# Patient Record
Sex: FEMALE | Race: WHITE | Employment: STUDENT | URBAN - METROPOLITAN AREA
[De-identification: names, ages, dates, MRNs, and addresses within clinical notes are randomized per-mention and may not be internally consistent; named-entity substitution may affect disease eponyms.]

---

## 2022-09-26 ENCOUNTER — OFFICE VISIT (OUTPATIENT)
Dept: OBGYN CLINIC | Age: 22
End: 2022-09-26
Payer: COMMERCIAL

## 2022-09-26 VITALS
HEART RATE: 104 BPM | WEIGHT: 137.8 LBS | HEIGHT: 62 IN | DIASTOLIC BLOOD PRESSURE: 82 MMHG | SYSTOLIC BLOOD PRESSURE: 128 MMHG | BODY MASS INDEX: 25.36 KG/M2

## 2022-09-26 DIAGNOSIS — Z30.09 BIRTH CONTROL COUNSELING: ICD-10-CM

## 2022-09-26 DIAGNOSIS — N94.6 DYSMENORRHEA: Primary | ICD-10-CM

## 2022-09-26 PROBLEM — L70.9 ACNE: Status: ACTIVE | Noted: 2020-07-13

## 2022-09-26 PROCEDURE — 99203 OFFICE O/P NEW LOW 30 MIN: CPT | Performed by: STUDENT IN AN ORGANIZED HEALTH CARE EDUCATION/TRAINING PROGRAM

## 2022-09-26 RX ORDER — DOXYCYCLINE HYCLATE 100 MG/1
CAPSULE ORAL
COMMUNITY
Start: 2022-09-09

## 2022-09-26 RX ORDER — DROSPIRENONE AND ETHINYL ESTRADIOL 0.02-3(28)
KIT ORAL
COMMUNITY
Start: 2022-09-10 | End: 2022-10-27 | Stop reason: SDUPTHER

## 2022-09-26 NOTE — PROGRESS NOTES
Ori Herring  2022    YOB: 2000    HPI:  Ori Herring is a 25 y.o. female    The patient was seen and examined today. She is here regarding further management of dysmenorrhea. Reports she is taking Mariely (Drospirenone and ethinyl estradiol 3mg/0.02 mg) for her hx of heavy periods and dysmenorrhea. She started taking this brand 4 years ago and it also helped her acne. Recently, her acne has worsened. She established care with derm and started doxy in August. Has noticed a slight improvement in her acne since starting this antibiotic. Reports she wants to stop taking Mariely because it is hard for her to remember to take the pills daily. She was interested in other forms of contraception that will continue to manage her heavy periods and dysmenorrhea. She is currently sexually active.       OB History    Para Term  AB Living   0 0 0 0 0 0   SAB IAB Ectopic Molar Multiple Live Births   0 0 0 0 0 0       Past Medical History:   Diagnosis Date    Psoriasis        Past Surgical History:   Procedure Laterality Date    WISDOM TOOTH EXTRACTION         Family History   Problem Relation Age of Onset    No Known Problems Mother     Hypertension Father     Heart Disease Father     No Known Problems Maternal Grandmother     No Known Problems Maternal Grandfather     No Known Problems Paternal Grandmother     No Known Problems Paternal Grandfather        Social History     Socioeconomic History    Marital status: Single     Spouse name: Not on file    Number of children: Not on file    Years of education: Not on file    Highest education level: Not on file   Occupational History    Not on file   Tobacco Use    Smoking status: Never    Smokeless tobacco: Never   Vaping Use    Vaping Use: Never used   Substance and Sexual Activity    Alcohol use: Never    Drug use: Never    Sexual activity: Yes     Birth control/protection: Pill   Other Topics Concern    Not on file   Social History Narrative    Not on file     Social Determinants of Health     Financial Resource Strain: Not on file   Food Insecurity: Not on file   Transportation Needs: Not on file   Physical Activity: Not on file   Stress: Not on file   Social Connections: Not on file   Intimate Partner Violence: Not on file   Housing Stability: Not on file       MEDICATIONS:  Current Outpatient Medications   Medication Sig Dispense Refill    doxycycline hyclate (VIBRAMYCIN) 100 MG capsule TAKE 1 CAPSULE BY MOUTH TWO TIMES A DAY WITH FOOD      drospirenone-ethinyl estradiol (JANIE) 3-0.02 MG per tablet TAKE 1 TABLET BY MOUTH EVERY DAY       No current facility-administered medications for this visit.        ALLERGIES:  Allergies as of 09/26/2022    (No Known Allergies)       REVIEW OF SYSTEMS:    Constitutional: negative fever, negative chills, negative weight changes   HEENT: negative visual disturbances, negative headaches, negative dizziness, pos acne   Breast: Negative breast abnormalities, negative breast lumps  Respiratory: negative dyspnea, negative cough, negative SOB  Cardiovascular: negative chest pain,  negative palpitations, negative arrhythmia   Gastrointestinal: negative abdominal pain, negative N/V, negative bowel changes, negative heartburn   Genitourinary: negative dysuria, negative hematuria, negative urinary incontinence, negative vaginal discharge,  dysmenorrhea, heavy periods   Dermatological: negative rash, negative pruritis, negative mole changes  Hematologic: negative bruising  Immunologic/Lymphatic: negative recent illness, negative recent sick contact  Musculoskeletal: negative back pain, negative myalgias, negative arthralgias  Neurological:  negative dizziness, negative migraines, negative seizures   Behavior/Psych: negative depression, negative anxiety     VITALS:  Vitals:    09/26/22 1040   BP: 128/82   Site: Left Upper Arm   Position: Sitting   Cuff Size: Medium Adult   Pulse: (!) 104   Weight: 137 lb 12.8 oz (62.5 kg)   Height: 5' 2\" (1.575 m)       PHYSICAL EXAM:  General appearance: no apparent distress, alert and cooperative  HEENT: head atraumatic, normocephalic, trachea midline, moist mucous membranes   Neurologic: alert, oriented, normal speech, no focal findings or movement disorder noted  Lungs: no increased work of breathing   Abdomen: soft, non acute abdomen   Extremities:  no calf tenderness bilaterally, non-edematous bilaterally   Musculoskeletal: no gross abnormalities, range of motion appropriate for age   Psychiatric: mood appropriate, normal affect      Assessment/Plan:  Dysmenorrhea and Birth Control Counseling    - VSS   - Discussed switching from pills to either Depo Provera, Nexplanon or Mirena IUD. Risks vs benefits of each type were reviewed with patient. She ultimately decided on getting the Nexplanon inserted. - Pt to continue taking Mariely until Nexplanon can be inserted    - Reviewed procedure w/ patient and what to expect    - Return to the office for Nexplanon insertion within 1-4 weeks      Diagnosis Orders   1. Dysmenorrhea        2. Birth control counseling          Patient Active Problem List    Diagnosis Date Noted    Dysmenorrhea 07/13/2020     Priority: Medium    Acne 07/13/2020     Priority: Medium     Counseling:  Repeat Annual every 1 year  Cervical Cytology Evaluation begins at 24years old. If Negative Cytology, Follow-up screening per current guidelines. Counseled on preventative health maintenance follow-up. No orders of the defined types were placed in this encounter. Patient was seen with total face to face time of 30 minutes. More than 50% of this visit was counseling and education regarding The primary encounter diagnosis was Dysmenorrhea. A diagnosis of Birth control counseling was also pertinent to this visit.  and Establish Care and Contraception (Currently on OCP for acne but considering switching - acne has flared up)   as well as counseling on preventative health maintenance follow-up.       Roney Hammans, DO [facility]  9/26/2022, 11:28 AM

## 2022-10-27 ENCOUNTER — HOSPITAL ENCOUNTER (OUTPATIENT)
Age: 22
Setting detail: SPECIMEN
Discharge: HOME | End: 2022-10-27

## 2022-10-27 ENCOUNTER — PROCEDURE VISIT (OUTPATIENT)
Dept: OBGYN CLINIC | Age: 22
End: 2022-10-27
Payer: COMMERCIAL

## 2022-10-27 VITALS
HEART RATE: 99 BPM | DIASTOLIC BLOOD PRESSURE: 68 MMHG | WEIGHT: 138.4 LBS | SYSTOLIC BLOOD PRESSURE: 108 MMHG | HEIGHT: 62 IN | BODY MASS INDEX: 25.47 KG/M2

## 2022-10-27 DIAGNOSIS — N89.8 VAGINAL IRRITATION: ICD-10-CM

## 2022-10-27 DIAGNOSIS — Z01.419 WELL WOMAN EXAM WITH ROUTINE GYNECOLOGICAL EXAM: ICD-10-CM

## 2022-10-27 DIAGNOSIS — B37.9 YEAST INFECTION: ICD-10-CM

## 2022-10-27 DIAGNOSIS — Z01.419 WELL WOMAN EXAM WITH ROUTINE GYNECOLOGICAL EXAM: Primary | ICD-10-CM

## 2022-10-27 DIAGNOSIS — Z23 NEED FOR HPV VACCINATION: ICD-10-CM

## 2022-10-27 DIAGNOSIS — N94.6 DYSMENORRHEA: ICD-10-CM

## 2022-10-27 LAB
CONTROL: PRESENT
PREGNANCY TEST URINE, POC: NEGATIVE

## 2022-10-27 PROCEDURE — 90651 9VHPV VACCINE 2/3 DOSE IM: CPT | Performed by: STUDENT IN AN ORGANIZED HEALTH CARE EDUCATION/TRAINING PROGRAM

## 2022-10-27 PROCEDURE — 81025 URINE PREGNANCY TEST: CPT | Performed by: STUDENT IN AN ORGANIZED HEALTH CARE EDUCATION/TRAINING PROGRAM

## 2022-10-27 PROCEDURE — G8484 FLU IMMUNIZE NO ADMIN: HCPCS | Performed by: STUDENT IN AN ORGANIZED HEALTH CARE EDUCATION/TRAINING PROGRAM

## 2022-10-27 PROCEDURE — 90471 IMMUNIZATION ADMIN: CPT | Performed by: STUDENT IN AN ORGANIZED HEALTH CARE EDUCATION/TRAINING PROGRAM

## 2022-10-27 PROCEDURE — 99395 PREV VISIT EST AGE 18-39: CPT | Performed by: STUDENT IN AN ORGANIZED HEALTH CARE EDUCATION/TRAINING PROGRAM

## 2022-10-27 RX ORDER — DROSPIRENONE AND ETHINYL ESTRADIOL 0.02-3(28)
1 KIT ORAL DAILY
Qty: 1 PACKET | Refills: 12 | Status: SHIPPED | OUTPATIENT
Start: 2022-10-27

## 2022-10-27 NOTE — PROGRESS NOTES
After obtaining consent, and per orders of Dr. Tamiko Kenyon, injection of Gardasil #1 given IM in Right deltoid by Jeronimo Padron CMA. Patient instructed to remain in clinic for 20 minutes afterwards, and to report any adverse reaction to me immediately.   LOT [id number]  EXP 8/9/24  NDC 9179-1155-07  RTC 2 months for 2nd dose

## 2022-10-27 NOTE — PROGRESS NOTES
Rodri Gayle  10/27/2022              25 y.o. Chief Complaint   Patient presents with    Annual Exam     Refill OCP  - decided against Nexplanon          Patient's last menstrual period was 10/10/2022. Primary Care Physician: No primary care provider on file. HPI : Rodri Gayle is a 25 y.o. female     Patient is here today for her well women annual exam. She was seen and examined. Reports she still has dysmenorrhea and was previously scheduled for a Nexplanon insertion today. She states she changed her mind and wants to continue using Mariely. Reports she will try to have better compliance with taking the Mariely daily to see if that improves her dysmenorrhea. She said she did not like the possibility of having irregular spotting with the Nexplanon so she wants to give OCPs one more chance before switching. She is currently sexually active but not regularly. She has had unprotected sex in the past. Reports some vaginal irritation over the last several days but thinks it is from the \"vaginal wash\"  she is using. She is currently spotting and does not want a spec exam but is agreeable to blind swabs.      She is agreeable to start gardasil today too   ________________________________________________________________________  OB History    Para Term  AB Living   0 0 0 0 0 0   SAB IAB Ectopic Molar Multiple Live Births   0 0 0 0 0 0     Past Medical History:   Diagnosis Date    Psoriasis                                                                    Past Surgical History:   Procedure Laterality Date    WISDOM TOOTH EXTRACTION       Family History   Problem Relation Age of Onset    No Known Problems Mother     Hypertension Father     Heart Disease Father     No Known Problems Maternal Grandmother     No Known Problems Maternal Grandfather     No Known Problems Paternal Grandmother     No Known Problems Paternal Grandfather      Social History     Socioeconomic History Marital status: Single     Spouse name: Not on file    Number of children: Not on file    Years of education: Not on file    Highest education level: Not on file   Occupational History    Not on file   Tobacco Use    Smoking status: Never    Smokeless tobacco: Never   Vaping Use    Vaping Use: Never used   Substance and Sexual Activity    Alcohol use: Never    Drug use: Never    Sexual activity: Yes     Birth control/protection: Pill   Other Topics Concern    Not on file   Social History Narrative    Not on file     Social Determinants of Health     Financial Resource Strain: Not on file   Food Insecurity: Not on file   Transportation Needs: Not on file   Physical Activity: Not on file   Stress: Not on file   Social Connections: Not on file   Intimate Partner Violence: Not on file   Housing Stability: Not on file       MEDICATIONS:  Current Outpatient Medications   Medication Sig Dispense Refill    drospirenone-ethinyl estradiol (JANIE) 3-0.02 MG per tablet Take 1 tablet by mouth daily 1 packet 12    doxycycline hyclate (VIBRAMYCIN) 100 MG capsule TAKE 1 CAPSULE BY MOUTH TWO TIMES A DAY WITH FOOD       No current facility-administered medications for this visit. ALLERGIES:  Allergies as of 10/27/2022    (No Known Allergies)       Symptoms of decreased mood: absent  Symptoms of anhedonia: absent       Immunization status: up to date and documented. - reports she got flu last week but not uploaded yet into the system     Gynecologic History:        Patient's last menstrual period was 10/10/2022.     Sexually Active: Yes- with males   STD History: No   Permanent Sterilization: No   Reversible Birth Control: Yes - Janie       Hormone Replacement Exposure: No    Genetic Qualified Family History of Breast, Ovarian , Colon or Uterine Cancer: No      Preventative Health Testing:  Health Maintenance Due:  Health Maintenance Due   Topic Date Due    Depression Screen  Never done    HIV screen  Never done    Chlamydia/GC screen  Never done    Hepatitis C screen  Never done    COVID-19 Vaccine (4 - Booster for Pfizer series) 07/20/2022    Flu vaccine (1) 08/01/2022       Date of Last Pap Smear: too young   Abnormal Pap Smear History: n/a  Colposcopy History: n/a  Date of Last Mammogram: n/a  Date of Last Colonoscopy: n/a  Date of Last Bone Density: n/a  ________________________________________________________________________  REVIEW OF SYSTEMS:    Constitutional: negative fever, negative chills, negative weight changes   HEENT: negative visual disturbances, negative headaches, negative dizziness   Breast: negative breast abnormalities, negative breast lumps, negative nipple discharge  Respiratory: negative dyspnea, negative cough, negative SOB  Cardiovascular: negative chest pain, negative palpitations, negative syncope   Gastrointestinal: negative abdominal pain, negative N/V, negative bowel changes, negative heartburn   Genitourinary: negative dysuria, negative hematuria, negative urinary incontinence, negative vaginal discharge,  pos dysmenorrhea   Dermatological: negative rash, negative pruritis, negative mole changes  Hematologic: negative bruising  Immunologic/Lymphatic: negative recent illness, negative recent sick contact  Musculoskeletal: negative back pain, negative myalgias, negative arthralgias  Neurological:  negative dizziness, negative migraines, negative seizures, negative weakness  Behavior/Psych: negative SI, negative HI                                                                                                                                                                                 PHYSICAL Exam:     Constitutional:  Vitals:    10/27/22 1044   BP: 108/68   Site: Left Upper Arm   Position: Sitting   Cuff Size: Medium Adult   Pulse: 99   Weight: 138 lb 6.4 oz (62.8 kg)   Height: 5' 2\" (1.575 m)        General appearance:  no apparent distress, alert and cooperative, BMI reviewed   HEENT: normocephalic, atraumatic, neck supple, no JVD, thyroid not enlarged with no palpable masses  Lymphatic: no lymph nodes were palpable in neck, axilla or groin   Neurologic:  normal speech, no focal findings or movement disorder noted  Lungs:  no increased work of breathing, good air exchange, clear to auscultation bilaterally, no crackles or wheezing  Heart:  regular rate and rhythm, no murmurs noted     Abdomen:  soft, non-tender, no guarding, rebound or rigidity on palpation, bowel sounds appreciated in all quadrants   Musculoskeletal: normal gait noted, no gross abnormalities appreciated, range of motion, stability and strength were evaluated and found to be appropriate for patient's age   Extremities:  no calf tenderness bilaterally, non-edematous bilaterally   Skin: normal inspection of skin without rashes or lesions  Psych:  normal orientation to time, place and person, good historian, no mood or affect changes, pleasant     Pelvic Exam: chaperone declined     Vulva: normal appearing vulva, no masses, tenderness or lesions, normal clitoris    Vagina: normal appearing urethral meatus, blood present at vulva consistent with spotting    Cervix: declined bimanual/spec    Uterus: declined bimanual    Adnexa: declined bimanual   Rectal Exam: external exam unremarkable   Breast: declined     POC Cultures:  Results for POC orders placed in visit on 10/27/22   POCT urine pregnancy   Result Value Ref Range    Preg Test, Ur NEGATIVE     Control present        ASSESSMENT/PLAN:  Peg Sharma is a 25 y.o. female  here for her annual exam      Well Women Gynecological Exam   - VSS      - Not due for pap due to age   - Vaginitis and GC/C collected and pending. Will call patient with any abnormal results      - Lengthy conversation held for STD counseling and prevention. Patient declined blood work for T.Pal, Hepatitis and HIV testing    - Birth control and barrier recommendations discussed. Will continue using Mariely.  Is going to try to be consistent with setting alarms and taking her pills nightly. Discussed consistent condom use especially if she is not always taking her pills. - Gardasil counseling completed for all patients 10-36 years old    -   Patient would like to start vaccine series today     -   Patient to return in 2 months and 6 months to complete series    - S/p flu vaccine last week, records not uploaded yet    - S/p COVID19 vaccine x3    - Routine health maintenance per patient's PCP   - Repeat annual exam every year   Return in about 2 months (around 12/27/2022) for gardasil vaccine . Dysmenorrhea    - Declined nexplanon insertion today    - Urine preg negative today    - Continue with Mariely    - Discussed pre medicating with motrin 600 mg Q6 prior to period starting and continuing for the first 24-48 hrs when her cramps are the most severe . States she has motrin at home and does not need a Rx    - If she cannot be consistent with Mariely, encouraged her to schedule a sooner follow up so can we find a form of hormone contraception that she will be compliant with    - New Rx  sent with refills    - PT was agreeable w/ plan     Counseling Completed:  Hereditary breast, ovarian, colon and uterine cancer screening done  Tobacco and secondary smoke risks reviewed.  Instructed on cessation and avoidance     Orders Placed This Encounter   Procedures    Vaginitis DNA Probe     Standing Status:   Future     Standing Expiration Date:   10/27/2023    C.trachomatis N.gonorrhoeae DNA     Standing Status:   Future     Standing Expiration Date:   10/27/2023    HPV, GARDASIL 9, (age 10-36 yrs), IM    POCT urine pregnancy    DC INJECTION,THERAP/PROPH/DIAGNOST, IM OR SUBCUT        Patient Active Problem List    Diagnosis Date Noted    Dysmenorrhea 07/13/2020     Priority: Medium    Acne 07/13/2020     Priority: [address], DO [facility]  10/27/2022, 12:22 PM

## 2022-10-28 LAB
C TRACH DNA GENITAL QL NAA+PROBE: NEGATIVE
CANDIDA SPECIES, DNA PROBE: POSITIVE
GARDNERELLA VAGINALIS, DNA PROBE: NEGATIVE
N. GONORRHOEAE DNA: NEGATIVE
SOURCE: ABNORMAL
SPECIMEN DESCRIPTION: NORMAL
TRICHOMONAS VAGINALIS DNA: NEGATIVE

## 2022-10-28 RX ORDER — FLUCONAZOLE 150 MG/1
150 TABLET ORAL ONCE
Qty: 1 TABLET | Refills: 0 | Status: SHIPPED | OUTPATIENT
Start: 2022-10-28 | End: 2022-10-28

## 2022-12-28 ENCOUNTER — NURSE ONLY (OUTPATIENT)
Dept: OBGYN CLINIC | Age: 22
End: 2022-12-28
Payer: COMMERCIAL

## 2022-12-28 VITALS — WEIGHT: 138 LBS | BODY MASS INDEX: 25.4 KG/M2 | HEIGHT: 62 IN

## 2022-12-28 DIAGNOSIS — Z23 NEED FOR HPV VACCINE: Primary | ICD-10-CM

## 2022-12-28 PROCEDURE — 90471 IMMUNIZATION ADMIN: CPT | Performed by: NURSE PRACTITIONER

## 2022-12-28 PROCEDURE — 90651 9VHPV VACCINE 2/3 DOSE IM: CPT | Performed by: NURSE PRACTITIONER

## 2022-12-28 NOTE — PROGRESS NOTES
After obtaining consent, and per orders of  Lauren Rodriguez CNP , injection of HPV 0.5mL given in Left deltoid IM by [facility]. Patient instructed to remain in clinic for 20 minutes afterwards, and to report any adverse reaction to me immediately.

## 2023-02-22 SDOH — ECONOMIC STABILITY: TRANSPORTATION INSECURITY
IN THE PAST 12 MONTHS, HAS LACK OF TRANSPORTATION KEPT YOU FROM MEETINGS, WORK, OR FROM GETTING THINGS NEEDED FOR DAILY LIVING?: NO

## 2023-02-22 SDOH — ECONOMIC STABILITY: FOOD INSECURITY: WITHIN THE PAST 12 MONTHS, YOU WORRIED THAT YOUR FOOD WOULD RUN OUT BEFORE YOU GOT MONEY TO BUY MORE.: NEVER TRUE

## 2023-02-22 SDOH — ECONOMIC STABILITY: HOUSING INSECURITY
IN THE LAST 12 MONTHS, WAS THERE A TIME WHEN YOU DID NOT HAVE A STEADY PLACE TO SLEEP OR SLEPT IN A SHELTER (INCLUDING NOW)?: NO

## 2023-02-22 SDOH — ECONOMIC STABILITY: INCOME INSECURITY: HOW HARD IS IT FOR YOU TO PAY FOR THE VERY BASICS LIKE FOOD, HOUSING, MEDICAL CARE, AND HEATING?: NOT HARD AT ALL

## 2023-02-22 SDOH — ECONOMIC STABILITY: FOOD INSECURITY: WITHIN THE PAST 12 MONTHS, THE FOOD YOU BOUGHT JUST DIDN'T LAST AND YOU DIDN'T HAVE MONEY TO GET MORE.: NEVER TRUE

## 2023-02-23 ENCOUNTER — HOSPITAL ENCOUNTER (OUTPATIENT)
Age: 23
Setting detail: SPECIMEN
Discharge: HOME | End: 2023-02-23

## 2023-02-23 ENCOUNTER — OFFICE VISIT (OUTPATIENT)
Dept: OBGYN CLINIC | Age: 23
End: 2023-02-23
Payer: COMMERCIAL

## 2023-02-23 VITALS
SYSTOLIC BLOOD PRESSURE: 110 MMHG | BODY MASS INDEX: 27.02 KG/M2 | DIASTOLIC BLOOD PRESSURE: 72 MMHG | HEIGHT: 62 IN | WEIGHT: 146.8 LBS

## 2023-02-23 DIAGNOSIS — N89.8 VAGINAL DISCHARGE: ICD-10-CM

## 2023-02-23 DIAGNOSIS — N89.8 VAGINAL DISCHARGE: Primary | ICD-10-CM

## 2023-02-23 PROCEDURE — G8427 DOCREV CUR MEDS BY ELIG CLIN: HCPCS | Performed by: NURSE PRACTITIONER

## 2023-02-23 PROCEDURE — G8419 CALC BMI OUT NRM PARAM NOF/U: HCPCS | Performed by: NURSE PRACTITIONER

## 2023-02-23 PROCEDURE — 99213 OFFICE O/P EST LOW 20 MIN: CPT | Performed by: NURSE PRACTITIONER

## 2023-02-23 PROCEDURE — G8484 FLU IMMUNIZE NO ADMIN: HCPCS | Performed by: NURSE PRACTITIONER

## 2023-02-23 PROCEDURE — 1036F TOBACCO NON-USER: CPT | Performed by: NURSE PRACTITIONER

## 2023-02-23 RX ORDER — FLUCONAZOLE 150 MG/1
150 TABLET ORAL
Qty: 2 TABLET | Refills: 0 | Status: SHIPPED | OUTPATIENT
Start: 2023-02-23 | End: 2023-02-26

## 2023-02-23 ASSESSMENT — ENCOUNTER SYMPTOMS
GASTROINTESTINAL NEGATIVE: 1
RESPIRATORY NEGATIVE: 1

## 2023-02-23 NOTE — PROGRESS NOTES
[address] [facility] [place]ppy [place]  [address] [facility] [address] [facility] [address] [address]  Dept: [phone]  Dept Fax: [phone]         2023  Veronica rPatt       Chief Complaint  Chief Complaint   Patient presents with    Vaginal Discharge      . Blood pressure 110/72, height 5' 2\" (1.575 m), weight 146 lb 12.8 oz (66.6 kg), last menstrual period 2023. HPI:     Veronica Pratt  2000 is a 23 y.o. Jamie Bourdon here today for evaluation of vaginal discharge with itching. She is on abx for acne control and was recently treated for a yeast infection.        OB History    Para Term  AB Living   0 0 0 0 0 0   SAB IAB Ectopic Molar Multiple Live Births   0 0 0 0 0 0       Past Medical History:   Diagnosis Date    Psoriasis        Past Surgical History:   Procedure Laterality Date    WISDOM TOOTH EXTRACTION         Family History   Problem Relation Age of Onset    No Known Problems Mother     Hypertension Father     Heart Disease Father     No Known Problems Maternal Grandmother     No Known Problems Maternal Grandfather     No Known Problems Paternal Grandmother     No Known Problems Paternal Grandfather        Social History     Socioeconomic History    Marital status: Single     Spouse name: Not on file    Number of children: Not on file    Years of education: Not on file    Highest education level: Not on file   Occupational History    Not on file   Tobacco Use    Smoking status: Never    Smokeless tobacco: Never   Vaping Use    Vaping Use: Never used   Substance and Sexual Activity    Alcohol use: Never    Drug use: Never    Sexual activity: Yes     Partners: Male     Birth control/protection: Pill   Other Topics Concern    Not on file   Social History Narrative    Not on file     Social Determinants of Health     Financial Resource Strain: Not on file   Food Insecurity: Not on file   Transportation Needs: Not on file Physical Activity: Not on file   Stress: Not on file   Social Connections: Not on file   Intimate Partner Violence: Not on file   Housing Stability: Not on file       No data recorded     **If either question is answered in a  positive fashion then complete the PHQ9 Scoring Evaluation and make the appropriate referral**    MEDICATIONS:  Current Outpatient Medications   Medication Sig Dispense Refill    fluconazole (DIFLUCAN) 150 MG tablet Take 1 tablet by mouth every 48 hours for 2 doses 2 tablet 0    drospirenone-ethinyl estradiol (JANIE) 3-0.02 MG per tablet Take 1 tablet by mouth daily 1 packet 12    doxycycline hyclate (VIBRAMYCIN) 100 MG capsule TAKE 1 CAPSULE BY MOUTH TWO TIMES A DAY WITH FOOD       No current facility-administered medications for this visit. ALLERGIES:  Allergies as of 02/23/2023    (No Known Allergies)         REVIEW OF SYSTEMS:        Review of Systems   Constitutional: Negative. HENT: Negative. Respiratory: Negative. Cardiovascular: Negative. Gastrointestinal: Negative. Genitourinary:  Positive for vaginal discharge (with itching). Physical Exam  Constitutional:       Appearance: Normal appearance. Pulmonary:      Effort: Pulmonary effort is normal.   Genitourinary:     General: Normal vulva. Vagina: Vaginal discharge present. Cervix: Normal.   Musculoskeletal:         General: Normal range of motion. Cervical back: Neck supple. Skin:     General: Skin is warm and dry. Neurological:      Mental Status: She is alert. Assessment/Plan   Diagnosis Orders   1. Vaginal discharge  Vaginitis DNA Probe    fluconazole (DIFLUCAN) 150 MG tablet        1. Vaginal discharge  -     Vaginitis DNA Probe; Future  -     fluconazole (DIFLUCAN) 150 MG tablet;  Take 1 tablet by mouth every 48 hours for 2 doses, Disp-2 tablet, R-0Normal           Return in about 7 months (around 9/23/2023) for annual exam.        Electronically signed by Lanette Stock EAGLE Lorenzana - CNP 2/23/2023 8:27 AM

## 2023-02-24 LAB
CANDIDA SPECIES, DNA PROBE: POSITIVE
GARDNERELLA VAGINALIS, DNA PROBE: POSITIVE
SOURCE: ABNORMAL
TRICHOMONAS VAGINALIS DNA: NEGATIVE

## 2023-02-24 RX ORDER — FLUCONAZOLE 150 MG/1
150 TABLET ORAL
Qty: 2 TABLET | Refills: 0 | Status: SHIPPED | OUTPATIENT
Start: 2023-02-24 | End: 2023-02-27

## 2023-02-24 RX ORDER — METRONIDAZOLE 500 MG/1
500 TABLET ORAL 2 TIMES DAILY
Qty: 14 TABLET | Refills: 0 | Status: SHIPPED | OUTPATIENT
Start: 2023-02-24 | End: 2023-03-03

## 2023-02-24 NOTE — RESULT ENCOUNTER NOTE
Her swab showed both yeast and bv and I have sent a rx for both flagyl and diflucan to her pharmacy.  Please let her know

## 2023-05-01 ENCOUNTER — NURSE ONLY (OUTPATIENT)
Dept: OBGYN CLINIC | Age: 23
End: 2023-05-01
Payer: COMMERCIAL

## 2023-05-01 VITALS
HEART RATE: 99 BPM | SYSTOLIC BLOOD PRESSURE: 124 MMHG | HEIGHT: 62 IN | WEIGHT: 146 LBS | BODY MASS INDEX: 26.87 KG/M2 | DIASTOLIC BLOOD PRESSURE: 70 MMHG

## 2023-05-01 DIAGNOSIS — Z23 NEED FOR HPV VACCINATION: Primary | ICD-10-CM

## 2023-05-01 PROCEDURE — 90471 IMMUNIZATION ADMIN: CPT | Performed by: NURSE PRACTITIONER

## 2023-05-01 PROCEDURE — 90651 9VHPV VACCINE 2/3 DOSE IM: CPT | Performed by: NURSE PRACTITIONER

## 2023-05-01 NOTE — PROGRESS NOTES
After obtaining verbal consent, and per orders of Alesha Lorenzana CNP, injection of HPV 0.5mL given in Right deltoid IM by Ryan Ball MA. Patient instructed to remain in clinic for 20 minutes afterwards, and to report any adverse reaction to me immediately.     M[address] HPV [id number]   LOT [id number]   EXP 1-8-25    Last injection:  12-28-22  Next injection:  n/a  Refills left: N/A    Last seen: 2/23/2023  Next appt: Visit date not found

## 2023-08-14 ENCOUNTER — HOSPITAL ENCOUNTER (OUTPATIENT)
Age: 23
Setting detail: SPECIMEN
Discharge: HOME | End: 2023-08-14

## 2023-08-15 LAB
HBV SURFACE AB SERPL IA-ACNC: 4.19 MIU/ML
RUBV IGG SERPL QL IA: 453.9 IU/ML

## 2023-08-16 LAB
MEV IGG SER-ACNC: 5.65
MUV IGG SER IA-ACNC: 5.42
VZV IGG SER QL IA: 2.28

## 2023-10-18 ENCOUNTER — TELEPHONE (OUTPATIENT)
Age: 23
End: 2023-10-18

## 2023-10-18 DIAGNOSIS — Z78.9 HEPATITIS B IMMUNE: Primary | ICD-10-CM

## 2023-10-24 ENCOUNTER — TELEPHONE (OUTPATIENT)
Age: 23
End: 2023-10-24

## 2023-10-24 DIAGNOSIS — N94.6 DYSMENORRHEA: ICD-10-CM

## 2023-10-24 DIAGNOSIS — Z01.84 IMMUNITY STATUS TESTING: Primary | ICD-10-CM

## 2023-10-24 DIAGNOSIS — Z01.419 WELL WOMAN EXAM WITH ROUTINE GYNECOLOGICAL EXAM: ICD-10-CM

## 2023-10-24 RX ORDER — DROSPIRENONE AND ETHINYL ESTRADIOL 0.02-3(28)
1 KIT ORAL DAILY
Qty: 1 PACKET | Refills: 2 | Status: SHIPPED | OUTPATIENT
Start: 2023-10-24

## 2023-10-24 NOTE — TELEPHONE ENCOUNTER
GYN pt calling in for a refill on her [facility] to hold her until her annual 12/29

## 2023-12-29 ENCOUNTER — OFFICE VISIT (OUTPATIENT)
Dept: OBGYN CLINIC | Age: 23
End: 2023-12-29
Payer: COMMERCIAL

## 2023-12-29 ENCOUNTER — HOSPITAL ENCOUNTER (OUTPATIENT)
Age: 23
Setting detail: SPECIMEN
Discharge: HOME | End: 2023-12-29

## 2023-12-29 VITALS
DIASTOLIC BLOOD PRESSURE: 88 MMHG | SYSTOLIC BLOOD PRESSURE: 155 MMHG | BODY MASS INDEX: 24.4 KG/M2 | HEIGHT: 62 IN | HEART RATE: 107 BPM | WEIGHT: 132.6 LBS

## 2023-12-29 DIAGNOSIS — N94.6 DYSMENORRHEA: ICD-10-CM

## 2023-12-29 DIAGNOSIS — Z01.419 ENCOUNTER FOR GYNECOLOGICAL EXAMINATION: Primary | ICD-10-CM

## 2023-12-29 PROCEDURE — G8484 FLU IMMUNIZE NO ADMIN: HCPCS | Performed by: NURSE PRACTITIONER

## 2023-12-29 PROCEDURE — 99395 PREV VISIT EST AGE 18-39: CPT | Performed by: NURSE PRACTITIONER

## 2023-12-29 RX ORDER — DROSPIRENONE AND ETHINYL ESTRADIOL 0.02-3(28)
1 KIT ORAL DAILY
Qty: 1 PACKET | Refills: 12 | Status: SHIPPED | OUTPATIENT
Start: 2023-12-29

## 2023-12-29 NOTE — PROGRESS NOTES
[address] [facility]  [facility] [place] [address] [address]  Dept: [phone]      12/29/23    Radha Schofield       Gyn Annual Exam      CHIEF COMPLAINT:    Chief Complaint   Patient presents with    Annual Exam                Blood pressure (!) 155/88, pulse (!) 107, height 1.575 m (5' 2\"), weight 60.1 kg (132 lb 9.6 oz), last menstrual period 12/04/2023. HPI :   Radha Schofield 2000 is a 26 y.o. Bhavya Jorge L  who is here for her annual exam. She is on ocps for dysmenorrhea and would like to discuss other options. She continues to have cramping and her acne has worsened. She has the same partner and declines STI testing    Periods are monthly, occurring every 28 days and lasting 5 days. HMB- yes first 3  Dysmenorrhea- yes    In nursing school at [employer]  _____________________________________________________________________  Past Medical History:   Diagnosis Date    Psoriasis                                                                    Past Surgical History:   Procedure Laterality Date    WISDOM TOOTH EXTRACTION  2020     Family History   Problem Relation Age of Onset    No Known Problems Mother     Hypertension Father     Heart Disease Father     No Known Problems Maternal Grandmother     No Known Problems Maternal Grandfather     No Known Problems Paternal Grandmother     No Known Problems Paternal Grandfather      Social History     Tobacco Use   Smoking Status Never   Smokeless Tobacco Never     Social History     Substance and Sexual Activity   Alcohol Use Never     Current Outpatient Medications   Medication Sig Dispense Refill    drospirenone-ethinyl estradiol (JANIE) 3-0.02 MG per tablet Take 1 tablet by mouth daily 1 packet 12    doxycycline hyclate (VIBRAMYCIN) 100 MG capsule TAKE 1 CAPSULE BY MOUTH TWO TIMES A DAY WITH FOOD       No current facility-administered medications for this visit.        Allergies:  No

## 2024-01-09 LAB — CYTOLOGY REPORT: NORMAL

## 2024-02-01 ENCOUNTER — PROCEDURE VISIT (OUTPATIENT)
Dept: OBGYN CLINIC | Age: 24
End: 2024-02-01

## 2024-02-01 VITALS — WEIGHT: 136.8 LBS | HEIGHT: 62 IN | BODY MASS INDEX: 25.17 KG/M2

## 2024-02-01 DIAGNOSIS — Z30.430 ENCOUNTER FOR IUD INSERTION: Primary | ICD-10-CM

## 2024-02-01 NOTE — PROGRESS NOTES
Chaperone for Intimate Exam  Chaperone was offered and accepted as part of the rooming process.  Chaperone: Liz Wong CMA

## 2024-02-01 NOTE — PROGRESS NOTES
[facility]  [address]  [address]  [place] [address]     Lisbeth Morales    returns today to have a progesterone IUD placed .  She has previously been counseled on alternative forms of contraception and has no absolute contraindications.  She denies any fever, chills, nausea/vomiting, significant abdominal/pelvic discomfort or UTI symptoms.  She's having normal menses and her LMP is  Patient's last menstrual period was 01/28/2024.   She is transitioning from ocps    Physical exam     Filed Vitals:               General appearance: Patient appears to be in no significant distress.  Lungs are clear to auscultation in all fields bilaterally without wheezes, rales or rhonchi.  Cardiac exam with normal sinus rhythm and rate without murmurs.  The abdomen  is soft and  non-tender without signs of infection.  There is no organomegaly or CVAT.  Bowel sounds are normally active.  No vulvar, vaginal or cervical lesions or inflammation . There is normal appearing arch.   Uterus nongravid and without CMT.  Adnexa nontender without abnormal masses bilaterally.  The anterior lip of the cervix was grasped with a single-tooth tenaculum and the uterus was sounded to 7 centimeter.  The IUD was placed uneventfully to the fundus and the IUD string trimmed to approximately 2.5-3 cm.  Post insertion instructions discussed and given to the patient.      ICD-10-CM    1. Encounter for IUD insertion  Z30.430 [id number] - VT INSERT INTRAUTERINE DEVICE     Levonorgestrel (KYLEENA) IUD 19.5 mg 1 each        She will return to the office  in 6-8 weeks for a brief IUD checkup or when necessary    Alesha Lorenzana, EAGLE - CNP 2/1/2024 3:01 PM    [facility]

## 2024-02-07 ENCOUNTER — TELEPHONE (OUTPATIENT)
Age: 24
End: 2024-02-07

## 2024-02-07 DIAGNOSIS — Z78.9 HEPATITIS B IMMUNE: Primary | ICD-10-CM

## 2024-02-29 ENCOUNTER — TELEPHONE (OUTPATIENT)
Dept: OBGYN CLINIC | Age: 24
End: 2024-02-29

## 2024-02-29 NOTE — TELEPHONE ENCOUNTER
Pt was called with US results   Pain has been going on now for 1 week   States pain was bad last night 6/10  Took 600mg a few days ago & takes some pain away   No pain currently comes & goes doesn't depend on activity   Also bright red bleeding that's been happening everyday for the past week & brown spotting as well   States is going on a hiking trip in [place] on Saturday won't be back in town until Wednesday night

## 2024-02-29 NOTE — TELEPHONE ENCOUNTER
----- Message from EAGLE Romeo CNP sent at 2/29/2024 10:41 AM EST -----  Her ultrasound shows that the IUD is in good position.  Hopefully her cramping has improved.  Ibuprofen prn

## 2024-03-25 ENCOUNTER — HOSPITAL ENCOUNTER (OUTPATIENT)
Age: 24
Setting detail: SPECIMEN
Discharge: HOME | End: 2024-03-25

## 2024-03-25 DIAGNOSIS — Z78.9 HEPATITIS B IMMUNE: ICD-10-CM

## 2024-03-25 LAB — HBV SURFACE AB SERPL IA-ACNC: >1000 MIU/ML

## 2024-03-28 ENCOUNTER — HOSPITAL ENCOUNTER (OUTPATIENT)
Age: 24
Setting detail: SPECIMEN
Discharge: HOME | End: 2024-03-28

## 2024-03-28 ENCOUNTER — PROCEDURE VISIT (OUTPATIENT)
Dept: OBGYN CLINIC | Age: 24
End: 2024-03-28
Payer: COMMERCIAL

## 2024-03-28 VITALS
WEIGHT: 138.6 LBS | BODY MASS INDEX: 25.51 KG/M2 | HEIGHT: 62 IN | DIASTOLIC BLOOD PRESSURE: 67 MMHG | SYSTOLIC BLOOD PRESSURE: 116 MMHG

## 2024-03-28 DIAGNOSIS — Z11.3 ROUTINE SCREENING FOR STI (SEXUALLY TRANSMITTED INFECTION): ICD-10-CM

## 2024-03-28 DIAGNOSIS — Z30.431 ENCOUNTER FOR ROUTINE CHECKING OF INTRAUTERINE CONTRACEPTIVE DEVICE (IUD): Primary | ICD-10-CM

## 2024-03-28 PROCEDURE — G8419 CALC BMI OUT NRM PARAM NOF/U: HCPCS | Performed by: NURSE PRACTITIONER

## 2024-03-28 PROCEDURE — 1036F TOBACCO NON-USER: CPT | Performed by: NURSE PRACTITIONER

## 2024-03-28 PROCEDURE — 99213 OFFICE O/P EST LOW 20 MIN: CPT | Performed by: NURSE PRACTITIONER

## 2024-03-28 PROCEDURE — G8484 FLU IMMUNIZE NO ADMIN: HCPCS | Performed by: NURSE PRACTITIONER

## 2024-03-28 PROCEDURE — G8427 DOCREV CUR MEDS BY ELIG CLIN: HCPCS | Performed by: NURSE PRACTITIONER

## 2024-03-28 ASSESSMENT — ENCOUNTER SYMPTOMS
GASTROINTESTINAL NEGATIVE: 1
RESPIRATORY NEGATIVE: 1

## 2024-03-28 NOTE — PROGRESS NOTES
Chaperone for Intimate Exam  Chaperone was offered as part of the rooming process. Patient declined and agrees to continue with exam without a chaperone.  Chaperone: NONE       
    IUD strings visualized at appropriate length  Cramping has subsided      Return in about 9 months (around 12/30/2024) for annual exam.        Electronically signed by EAGLE Romeo CNP 3/28/2024 3:14 PM

## 2024-03-29 LAB
CHLAMYDIA DNA UR QL NAA+PROBE: NEGATIVE
N GONORRHOEA DNA UR QL NAA+PROBE: NEGATIVE
SPECIMEN DESCRIPTION: NORMAL

## 2024-08-20 ENCOUNTER — OFFICE VISIT (OUTPATIENT)
Age: 24
End: 2024-08-20
Payer: COMMERCIAL

## 2024-08-20 VITALS
DIASTOLIC BLOOD PRESSURE: 80 MMHG | SYSTOLIC BLOOD PRESSURE: 120 MMHG | HEART RATE: 108 BPM | BODY MASS INDEX: 26.54 KG/M2 | TEMPERATURE: 97.2 F | OXYGEN SATURATION: 98 % | WEIGHT: 144.2 LBS | HEIGHT: 62 IN

## 2024-08-20 DIAGNOSIS — R00.2 PALPITATIONS: ICD-10-CM

## 2024-08-20 DIAGNOSIS — Z00.00 PERIODIC HEALTH ASSESSMENT, GENERAL SCREENING, ADULT: Primary | ICD-10-CM

## 2024-08-20 DIAGNOSIS — Z11.1 SCREENING FOR TUBERCULOSIS: ICD-10-CM

## 2024-08-20 DIAGNOSIS — L40.9 PSORIASIS: ICD-10-CM

## 2024-08-20 PROCEDURE — 93000 ELECTROCARDIOGRAM COMPLETE: CPT | Performed by: PHYSICIAN ASSISTANT

## 2024-08-20 PROCEDURE — 99395 PREV VISIT EST AGE 18-39: CPT | Performed by: PHYSICIAN ASSISTANT

## 2024-08-20 ASSESSMENT — ENCOUNTER SYMPTOMS
EYE PAIN: 0
BACK PAIN: 0
VOMITING: 0
SINUS PAIN: 0
BLOOD IN STOOL: 0
EYE REDNESS: 0
RHINORRHEA: 0
CONSTIPATION: 0
SHORTNESS OF BREATH: 0
DIARRHEA: 0
SINUS PRESSURE: 0
NAUSEA: 0
COUGH: 0
ABDOMINAL PAIN: 0
SORE THROAT: 0
WHEEZING: 0

## 2024-08-20 NOTE — PROGRESS NOTES
GYN.  recommend patient get the flu vaccine this fall.  2. Screening for tuberculosis  -     Mantoux testing  -     tuberculin injection 5 Units; 5 Units, IntraDERmal, ONCE, 1 dose, On Tue 8/20/24 at 1200  3. Palpitations  -     EKG 12 Lead; Future  -     Cardiac holter monitor (1 day-2 day); Future  -     Comprehensive Metabolic Panel; Future  -     CBC with Auto Differential; Future  -     TSH; Future  -     Magnesium; Future  Limit alcohol and caffeine.  Check a Holter monitor and labs and follow-up here after the testing in a few weeks.  4. Psoriasis  Keep follow-up with Derm    Return in about 4 weeks (around 9/17/2024) for f/u palpitations/testing .    COMMUNICATION:       Electronically signed by Cynthia Vaughan PA-C on 8/20/2024 at 12:15 PM

## 2024-08-22 ENCOUNTER — NURSE ONLY (OUTPATIENT)
Age: 24
End: 2024-08-22

## 2024-08-23 ENCOUNTER — HOSPITAL ENCOUNTER (OUTPATIENT)
Age: 24
Discharge: HOME | End: 2024-08-23
Payer: COMMERCIAL

## 2024-08-23 ENCOUNTER — HOSPITAL ENCOUNTER (OUTPATIENT)
Age: 24
Discharge: HOME | End: 2024-08-23
Attending: PHYSICIAN ASSISTANT
Payer: COMMERCIAL

## 2024-08-23 DIAGNOSIS — R00.2 PALPITATIONS: ICD-10-CM

## 2024-08-23 LAB
ALBUMIN SERPL-MCNC: 4.8 G/DL (ref 3.5–5.2)
ALBUMIN/GLOB SERPL: 2 {RATIO} (ref 1–2.5)
ALP SERPL-CCNC: 82 U/L (ref 35–104)
ALT SERPL-CCNC: 12 U/L (ref 10–35)
ANION GAP SERPL CALCULATED.3IONS-SCNC: 10 MMOL/L (ref 9–16)
AST SERPL-CCNC: 19 U/L (ref 10–35)
BASOPHILS # BLD: 0.03 K/UL (ref 0–0.2)
BASOPHILS NFR BLD: 1 % (ref 0–2)
BILIRUB SERPL-MCNC: 0.3 MG/DL (ref 0–1.2)
BUN SERPL-MCNC: 7 MG/DL (ref 6–20)
CALCIUM SERPL-MCNC: 9.8 MG/DL (ref 8.6–10.4)
CHLORIDE SERPL-SCNC: 102 MMOL/L (ref 98–107)
CO2 SERPL-SCNC: 27 MMOL/L (ref 20–31)
CREAT SERPL-MCNC: 0.7 MG/DL (ref 0.5–0.9)
EOSINOPHIL # BLD: 0.12 K/UL (ref 0–0.44)
EOSINOPHILS RELATIVE PERCENT: 2 % (ref 1–4)
ERYTHROCYTE [DISTWIDTH] IN BLOOD BY AUTOMATED COUNT: 13.4 % (ref 11.8–14.4)
GFR, ESTIMATED: >90 ML/MIN/1.73M2
GLUCOSE SERPL-MCNC: 87 MG/DL (ref 74–99)
HCT VFR BLD AUTO: 40.9 % (ref 36.3–47.1)
HGB BLD-MCNC: 13.4 G/DL (ref 11.9–15.1)
IMM GRANULOCYTES # BLD AUTO: <0.03 K/UL (ref 0–0.3)
IMM GRANULOCYTES NFR BLD: 0 %
LYMPHOCYTES NFR BLD: 2.4 K/UL (ref 1.1–3.7)
LYMPHOCYTES RELATIVE PERCENT: 40 % (ref 25–45)
MAGNESIUM SERPL-MCNC: 2.2 MG/DL (ref 1.6–2.6)
MCH RBC QN AUTO: 29.3 PG (ref 25.2–33.5)
MCHC RBC AUTO-ENTMCNC: 32.8 G/DL (ref 28.4–34.8)
MCV RBC AUTO: 89.5 FL (ref 82.6–102.9)
MONOCYTES NFR BLD: 0.49 K/UL (ref 0.1–1.4)
MONOCYTES NFR BLD: 8 % (ref 2–8)
NEUTROPHILS NFR BLD: 49 % (ref 34–64)
NEUTS SEG NFR BLD: 3.02 K/UL (ref 1.5–8.1)
NRBC BLD-RTO: 0 PER 100 WBC
PLATELET # BLD AUTO: 283 K/UL (ref 138–453)
PMV BLD AUTO: 9.5 FL (ref 8.1–13.5)
POTASSIUM SERPL-SCNC: 4.1 MMOL/L (ref 3.7–5.3)
PROT SERPL-MCNC: 7.3 G/DL (ref 6.6–8.7)
RBC # BLD AUTO: 4.57 M/UL (ref 3.95–5.11)
SODIUM SERPL-SCNC: 139 MMOL/L (ref 136–145)
TSH SERPL DL<=0.05 MIU/L-ACNC: 2.7 UIU/ML (ref 0.27–4.2)
WBC OTHER # BLD: 6.1 K/UL (ref 4.5–13.5)

## 2024-08-23 PROCEDURE — 85025 COMPLETE CBC W/AUTO DIFF WBC: CPT

## 2024-08-23 PROCEDURE — 36415 COLL VENOUS BLD VENIPUNCTURE: CPT

## 2024-08-23 PROCEDURE — 84443 ASSAY THYROID STIM HORMONE: CPT

## 2024-08-23 PROCEDURE — 93225 XTRNL ECG REC<48 HRS REC: CPT

## 2024-08-23 PROCEDURE — 80053 COMPREHEN METABOLIC PANEL: CPT

## 2024-08-23 PROCEDURE — 83735 ASSAY OF MAGNESIUM: CPT

## 2024-09-23 ENCOUNTER — OFFICE VISIT (OUTPATIENT)
Age: 24
End: 2024-09-23
Payer: COMMERCIAL

## 2024-09-23 VITALS
OXYGEN SATURATION: 99 % | SYSTOLIC BLOOD PRESSURE: 110 MMHG | DIASTOLIC BLOOD PRESSURE: 76 MMHG | BODY MASS INDEX: 26.37 KG/M2 | TEMPERATURE: 96.8 F | HEART RATE: 100 BPM | HEIGHT: 62 IN

## 2024-09-23 DIAGNOSIS — I49.3 PVC (PREMATURE VENTRICULAR CONTRACTION): Primary | ICD-10-CM

## 2024-09-23 DIAGNOSIS — L40.9 PSORIASIS: ICD-10-CM

## 2024-09-23 PROCEDURE — G8419 CALC BMI OUT NRM PARAM NOF/U: HCPCS | Performed by: INTERNAL MEDICINE

## 2024-09-23 PROCEDURE — G8427 DOCREV CUR MEDS BY ELIG CLIN: HCPCS | Performed by: INTERNAL MEDICINE

## 2024-09-23 PROCEDURE — 1036F TOBACCO NON-USER: CPT | Performed by: INTERNAL MEDICINE

## 2024-09-23 PROCEDURE — 99214 OFFICE O/P EST MOD 30 MIN: CPT | Performed by: INTERNAL MEDICINE

## 2024-09-23 SDOH — ECONOMIC STABILITY: FOOD INSECURITY: WITHIN THE PAST 12 MONTHS, YOU WORRIED THAT YOUR FOOD WOULD RUN OUT BEFORE YOU GOT MONEY TO BUY MORE.: NEVER TRUE

## 2024-09-23 SDOH — ECONOMIC STABILITY: FOOD INSECURITY: WITHIN THE PAST 12 MONTHS, THE FOOD YOU BOUGHT JUST DIDN'T LAST AND YOU DIDN'T HAVE MONEY TO GET MORE.: NEVER TRUE

## 2024-09-23 SDOH — ECONOMIC STABILITY: INCOME INSECURITY: HOW HARD IS IT FOR YOU TO PAY FOR THE VERY BASICS LIKE FOOD, HOUSING, MEDICAL CARE, AND HEATING?: NOT VERY HARD

## 2024-09-23 ASSESSMENT — ENCOUNTER SYMPTOMS
VOMITING: 0
BLOOD IN STOOL: 0
WHEEZING: 0
COUGH: 0
RHINORRHEA: 0
SINUS PAIN: 0
EYE REDNESS: 0
ABDOMINAL PAIN: 0
SORE THROAT: 0
DIARRHEA: 0
SINUS PRESSURE: 0
EYE PAIN: 0
SHORTNESS OF BREATH: 0
NAUSEA: 0
BACK PAIN: 0
CONSTIPATION: 0

## 2024-09-23 ASSESSMENT — PATIENT HEALTH QUESTIONNAIRE - PHQ9
SUM OF ALL RESPONSES TO PHQ QUESTIONS 1-9: 0
SUM OF ALL RESPONSES TO PHQ9 QUESTIONS 1 & 2: 0
2. FEELING DOWN, DEPRESSED OR HOPELESS: NOT AT ALL
SUM OF ALL RESPONSES TO PHQ QUESTIONS 1-9: 0
1. LITTLE INTEREST OR PLEASURE IN DOING THINGS: NOT AT ALL
SUM OF ALL RESPONSES TO PHQ QUESTIONS 1-9: 0
SUM OF ALL RESPONSES TO PHQ QUESTIONS 1-9: 0

## 2024-10-07 ENCOUNTER — PROCEDURE VISIT (OUTPATIENT)
Dept: OBGYN CLINIC | Age: 24
End: 2024-10-07
Payer: COMMERCIAL

## 2024-10-07 VITALS
HEIGHT: 62 IN | DIASTOLIC BLOOD PRESSURE: 78 MMHG | BODY MASS INDEX: 26.54 KG/M2 | SYSTOLIC BLOOD PRESSURE: 121 MMHG | WEIGHT: 144.2 LBS

## 2024-10-07 DIAGNOSIS — Z30.432 ENCOUNTER FOR IUD REMOVAL: Primary | ICD-10-CM

## 2024-10-07 PROCEDURE — 58301 REMOVE INTRAUTERINE DEVICE: CPT | Performed by: NURSE PRACTITIONER

## 2024-10-07 ASSESSMENT — ENCOUNTER SYMPTOMS
GASTROINTESTINAL NEGATIVE: 1
RESPIRATORY NEGATIVE: 1

## 2024-10-07 NOTE — PROGRESS NOTES
[facility]  [facility]  [address]  [address]  [place] [address]  Dept: [phone]  Dept Fax: [phone]         10/7/2024  Lisbeth Funez       Chief Complaint  Chief Complaint   Patient presents with    Procedure     Remove Kyleena - placed 24 - wants break for birth control for now might consider pills     .    Blood pressure 121/78, height 1.575 m (5' 2\"), weight 65.4 kg (144 lb 3.2 oz).    HPI:     Lisbeth Funez  2000 is a 23 y.o.  here today to have her IUD removed.  It was placed 2024.  She has had pain and cramping with it.  Ultrasound in March confirmed porper location.  She does have heavy, painful cycles and has done ocps in the past      OB History    Para Term  AB Living   0 0 0 0 0 0   SAB IAB Ectopic Molar Multiple Live Births   0 0 0 0 0 0       Past Medical History:   Diagnosis Date    Psoriasis        Past Surgical History:   Procedure Laterality Date    INTRAUTERINE DEVICE INSERTION  2024    Kyleena    WISDOM TOOTH EXTRACTION         Family History   Problem Relation Age of Onset    No Known Problems Mother     Hypertension Father     Heart Disease Father     No Known Problems Maternal Grandmother     No Known Problems Maternal Grandfather     No Known Problems Paternal Grandmother     Cerebral Aneurysm Paternal Grandfather     Other Cousin         [facility]       Social History     Socioeconomic History    Marital status: Single     Spouse name: Not on file    Number of children: Not on file    Years of education: Not on file    Highest education level: Not on file   Occupational History    Not on file   Tobacco Use    Smoking status: Never    Smokeless tobacco: Never   Vaping Use    Vaping status: Never Used   Substance and Sexual Activity    Alcohol use: Never    Drug use: Never    Sexual activity: Yes     Partners: Male     Birth control/protection: I.U.D.   Other

## 2024-11-21 ENCOUNTER — OFFICE VISIT (OUTPATIENT)
Age: 24
End: 2024-11-21
Payer: COMMERCIAL

## 2024-11-21 VITALS
WEIGHT: 157 LBS | BODY MASS INDEX: 28.72 KG/M2 | DIASTOLIC BLOOD PRESSURE: 62 MMHG | TEMPERATURE: 96.8 F | OXYGEN SATURATION: 99 % | SYSTOLIC BLOOD PRESSURE: 110 MMHG | HEART RATE: 90 BPM

## 2024-11-21 DIAGNOSIS — R10.2 SUPRAPUBIC PAIN: Primary | ICD-10-CM

## 2024-11-21 LAB
BILIRUBIN, POC: NEGATIVE
BLOOD URINE, POC: NEGATIVE
CLARITY, POC: CLEAR
COLOR, POC: YELLOW
GLUCOSE URINE, POC: NEGATIVE MG/DL
KETONES, POC: NEGATIVE MG/DL
LEUKOCYTE EST, POC: NEGATIVE
NITRITE, POC: NEGATIVE
PH, POC: 6.5
PROTEIN, POC: NEGATIVE MG/DL
SPECIFIC GRAVITY, POC: 1.01
UROBILINOGEN, POC: 0.2 MG/DL

## 2024-11-21 PROCEDURE — 81002 URINALYSIS NONAUTO W/O SCOPE: CPT | Performed by: PHYSICIAN ASSISTANT

## 2024-11-21 PROCEDURE — G8419 CALC BMI OUT NRM PARAM NOF/U: HCPCS | Performed by: PHYSICIAN ASSISTANT

## 2024-11-21 PROCEDURE — G8484 FLU IMMUNIZE NO ADMIN: HCPCS | Performed by: PHYSICIAN ASSISTANT

## 2024-11-21 PROCEDURE — 99213 OFFICE O/P EST LOW 20 MIN: CPT | Performed by: PHYSICIAN ASSISTANT

## 2024-11-21 PROCEDURE — 1036F TOBACCO NON-USER: CPT | Performed by: PHYSICIAN ASSISTANT

## 2024-11-21 PROCEDURE — G8427 DOCREV CUR MEDS BY ELIG CLIN: HCPCS | Performed by: PHYSICIAN ASSISTANT

## 2024-11-21 RX ORDER — CEPHALEXIN 500 MG/1
500 CAPSULE ORAL 3 TIMES DAILY
Qty: 9 CAPSULE | Refills: 0 | Status: SHIPPED | OUTPATIENT
Start: 2024-11-21 | End: 2024-11-24

## 2024-11-21 ASSESSMENT — ENCOUNTER SYMPTOMS
NAUSEA: 0
BLOOD IN STOOL: 0
SINUS PRESSURE: 0
EYE REDNESS: 0
SINUS PAIN: 0
CONSTIPATION: 0
BACK PAIN: 0
EYE PAIN: 0
WHEEZING: 0
SORE THROAT: 0
RHINORRHEA: 0
DIARRHEA: 0
VOMITING: 0
ABDOMINAL PAIN: 0
SHORTNESS OF BREATH: 0
COUGH: 0

## 2024-11-21 NOTE — PROGRESS NOTES
[facility]     Date of Visit:  2024  Patient Name: Lisbeth Funez   Patient :  2000     CHIEF COMPLAINT:     Lisbeth Funez is a 23 y.o. female who presents today for an general visit to be evaluated for the following condition(s):  Chief Complaint   Patient presents with    Urinary Tract Infection     Pain abdominal,bladder area. started last week and she took AZO and it went away but pain just stared again yesterday, took ibuprofen and went away.   Started period today       HISTORY OF PRESENT ILLNESS      Sx started last Tuesday, 24,  with suprapubic pain & dysuria. Took azo for 2 days which helped, sx resolved. Started again yesterday with similar sx. Only took ibuprofen yesterday which helped.   No fever/chills. No n/v. No eating ok.  No back pain.  Started period this am which was the first one since  because IUD was placed in Feb and just had spotting until .   Had IUD removed a month ago on Oct 7 , since not sexually active since Feb. no vaginal discharge.  No history of STDs.  She is currently wearing a tampon for the period    REVIEW OF SYSTEMS     Review of Systems   Constitutional:  Negative for chills, fever and unexpected weight change.   HENT:  Negative for congestion, ear pain, hearing loss, rhinorrhea, sinus pressure, sinus pain, sneezing and sore throat.    Eyes:  Negative for pain, redness and visual disturbance.   Respiratory:  Negative for cough, shortness of breath and wheezing.    Cardiovascular:  Negative for chest pain, palpitations and leg swelling.   Gastrointestinal:  Negative for abdominal pain, blood in stool, constipation, diarrhea, nausea and vomiting.   Endocrine: Negative for cold intolerance and heat intolerance.   Genitourinary:  Positive for dysuria and pelvic pain. Negative for difficulty urinating, frequency, hematuria and urgency.   Musculoskeletal:  Negative for arthralgias, back pain, gait problem, joint swelling, myalgias and neck

## 2025-01-03 ENCOUNTER — OFFICE VISIT (OUTPATIENT)
Dept: OBGYN CLINIC | Age: 25
End: 2025-01-03
Payer: COMMERCIAL

## 2025-01-03 VITALS
SYSTOLIC BLOOD PRESSURE: 113 MMHG | WEIGHT: 142 LBS | DIASTOLIC BLOOD PRESSURE: 73 MMHG | HEIGHT: 63 IN | HEART RATE: 82 BPM | BODY MASS INDEX: 25.16 KG/M2

## 2025-01-03 DIAGNOSIS — Z01.419 ENCOUNTER FOR GYNECOLOGICAL EXAMINATION: Primary | ICD-10-CM

## 2025-01-03 PROCEDURE — 99395 PREV VISIT EST AGE 18-39: CPT | Performed by: NURSE PRACTITIONER

## 2025-01-03 RX ORDER — TRETINOIN 0.25 MG/G
CREAM TOPICAL
COMMUNITY
Start: 2024-10-16

## 2025-01-03 RX ORDER — ERYTHROMYCIN 5 MG/G
0.5 OINTMENT OPHTHALMIC NIGHTLY
COMMUNITY
Start: 2024-12-21 | End: 2025-01-03

## 2025-01-03 RX ORDER — TRIFAROTENE 50 UG/G
CREAM TOPICAL
COMMUNITY
Start: 2025-01-02

## 2025-01-03 RX ORDER — CLASCOTERONE 1 G/100G
CREAM TOPICAL
COMMUNITY
Start: 2024-12-20

## 2025-01-03 ASSESSMENT — ENCOUNTER SYMPTOMS
BACK PAIN: 0
ABDOMINAL DISTENTION: 0
COUGH: 0
SHORTNESS OF BREATH: 0
RESPIRATORY NEGATIVE: 1
GASTROINTESTINAL NEGATIVE: 1
ALLERGIC/IMMUNOLOGIC NEGATIVE: 1

## 2025-01-03 NOTE — PROGRESS NOTES
The patient is being seen for a physical exam.   The patient was asked if they would like a chaperone in the room during the exam..  The patient has respectfully declined    
every other day to every day at bedtime as tolerated      AKLIEF 0.005 % CREA       Multiple Vitamin (MULTIVITAMIN PO) Take by mouth       No current facility-administered medications for this visit.       Allergies:  No Known Allergies          2024     2:51 PM   PHQ-9    Little interest or pleasure in doing things 0   Feeling down, depressed, or hopeless 0   PHQ-2 Score 0   PHQ-9 Total Score 0        **If either question is answered in a  positive fashion then complete the PHQ9 Scoring Evaluation and make the appropriate referral**      Gynecologic History:  Patient's last menstrual period was 2024 (exact date).  Sexually Active: No  Discussed using condoms for STI protection yes  Dyspareunia: no   STD History: No  Birth Control: No  Family hx Breast, Ovarian, Colorectal Cancer: breast- mgm       OB History    Para Term  AB Living   0 0 0 0 0 0   SAB IAB Ectopic Molar Multiple Live Births   0 0 0 0 0 0       Review of Systems   Constitutional: Negative.  Negative for activity change, appetite change and fatigue.   HENT: Negative.     Respiratory: Negative.  Negative for cough and shortness of breath.    Cardiovascular: Negative.    Gastrointestinal: Negative.  Negative for abdominal distention.   Endocrine: Negative.    Genitourinary: Negative.    Musculoskeletal:  Negative for arthralgias and back pain.   Skin: Negative.    Allergic/Immunologic: Negative.    Neurological:  Negative for dizziness and light-headedness.       Physical Exam  Vitals reviewed.   Constitutional:       Appearance: Normal appearance.   HENT:      Head: Normocephalic.   Cardiovascular:      Rate and Rhythm: Normal rate and regular rhythm.   Pulmonary:      Effort: Pulmonary effort is normal.      Breath sounds: Normal breath sounds.   Chest:   Breasts:     Right: Normal. No mass, skin change or tenderness.      Left: Normal. No mass, skin change or tenderness.   Abdominal:      General: Abdomen is flat.